# Patient Record
Sex: MALE | Race: OTHER | NOT HISPANIC OR LATINO
[De-identification: names, ages, dates, MRNs, and addresses within clinical notes are randomized per-mention and may not be internally consistent; named-entity substitution may affect disease eponyms.]

---

## 2023-05-15 PROBLEM — Z00.00 ENCOUNTER FOR PREVENTIVE HEALTH EXAMINATION: Status: ACTIVE | Noted: 2023-05-15

## 2023-05-16 ENCOUNTER — APPOINTMENT (OUTPATIENT)
Dept: ULTRASOUND IMAGING | Facility: CLINIC | Age: 28
End: 2023-05-16
Payer: COMMERCIAL

## 2023-05-16 PROCEDURE — 76536 US EXAM OF HEAD AND NECK: CPT

## 2023-10-19 ENCOUNTER — APPOINTMENT (OUTPATIENT)
Dept: OTOLARYNGOLOGY | Facility: CLINIC | Age: 28
End: 2023-10-19
Payer: COMMERCIAL

## 2023-10-19 ENCOUNTER — NON-APPOINTMENT (OUTPATIENT)
Age: 28
End: 2023-10-19

## 2023-10-19 VITALS
BODY MASS INDEX: 27.83 KG/M2 | HEART RATE: 69 BPM | TEMPERATURE: 97.6 F | SYSTOLIC BLOOD PRESSURE: 127 MMHG | HEIGHT: 73 IN | WEIGHT: 210 LBS | DIASTOLIC BLOOD PRESSURE: 85 MMHG

## 2023-10-19 DIAGNOSIS — Z78.9 OTHER SPECIFIED HEALTH STATUS: ICD-10-CM

## 2023-10-19 PROCEDURE — 31575 DIAGNOSTIC LARYNGOSCOPY: CPT

## 2023-10-19 PROCEDURE — 99202 OFFICE O/P NEW SF 15 MIN: CPT | Mod: 25

## 2023-10-19 PROCEDURE — 99204 OFFICE O/P NEW MOD 45 MIN: CPT | Mod: 25

## 2023-11-13 ENCOUNTER — APPOINTMENT (OUTPATIENT)
Dept: ULTRASOUND IMAGING | Facility: HOSPITAL | Age: 28
End: 2023-11-13

## 2023-11-13 ENCOUNTER — OUTPATIENT (OUTPATIENT)
Dept: OUTPATIENT SERVICES | Facility: HOSPITAL | Age: 28
LOS: 1 days | End: 2023-11-13
Payer: COMMERCIAL

## 2023-11-13 PROCEDURE — 76536 US EXAM OF HEAD AND NECK: CPT

## 2023-11-13 PROCEDURE — 76536 US EXAM OF HEAD AND NECK: CPT | Mod: 26

## 2023-11-14 ENCOUNTER — TRANSCRIPTION ENCOUNTER (OUTPATIENT)
Age: 28
End: 2023-11-14

## 2023-11-16 ENCOUNTER — APPOINTMENT (OUTPATIENT)
Dept: OTOLARYNGOLOGY | Facility: CLINIC | Age: 28
End: 2023-11-16
Payer: COMMERCIAL

## 2023-11-16 VITALS
HEIGHT: 73 IN | TEMPERATURE: 97.3 F | SYSTOLIC BLOOD PRESSURE: 128 MMHG | DIASTOLIC BLOOD PRESSURE: 75 MMHG | WEIGHT: 220 LBS | BODY MASS INDEX: 29.16 KG/M2 | HEART RATE: 63 BPM

## 2023-11-16 DIAGNOSIS — R59.0 LOCALIZED ENLARGED LYMPH NODES: ICD-10-CM

## 2023-11-16 PROCEDURE — 99214 OFFICE O/P EST MOD 30 MIN: CPT

## 2024-06-18 ENCOUNTER — NON-APPOINTMENT (OUTPATIENT)
Age: 29
End: 2024-06-18

## 2024-07-26 ENCOUNTER — APPOINTMENT (OUTPATIENT)
Dept: ULTRASOUND IMAGING | Facility: HOSPITAL | Age: 29
End: 2024-07-26

## 2024-08-06 ENCOUNTER — APPOINTMENT (OUTPATIENT)
Dept: OTOLARYNGOLOGY | Facility: CLINIC | Age: 29
End: 2024-08-06

## 2024-08-06 PROCEDURE — 31575 DIAGNOSTIC LARYNGOSCOPY: CPT

## 2024-08-06 PROCEDURE — 69210 REMOVE IMPACTED EAR WAX UNI: CPT

## 2024-08-06 PROCEDURE — 99214 OFFICE O/P EST MOD 30 MIN: CPT | Mod: 25

## 2024-08-06 NOTE — PROCEDURE
[de-identified] : - Pre-operative Diagnosis: Persistent throat discomfort Post-operative Diagnosis: Normal exam Anesthesia: Topical - 1 % Lidocaine/Phenylephrine Procedure:  Flexible Laryngoscopy   Procedure Details:   The patient was placed in the sitting position.  After decongestant and anesthesia were applied the laryngoscope was passed.  The nasal cavities, nasopharynx, oropharynx, hypopharynx, and larynx were all examined.  Vocal folds were examined during respiration and phonation.  The following findings were noted:  Findings:   Nose: Septum is midline, turbinates are normal, nasal airways patent, mucosa normal Nasopharynx: Adenoids normal, no masses, eustachian tube normal Oropharynx: Pharyngeal walls symmetric and without lesion. Tonsils/fossae symmetric Hypopharynx: Hypopharynx and pyriform sinuses without lesion. No masses or asymmetry.  No pooling of secretions. Larynx:  Epiglottis and aryepiglottic folds were sharp and crisp bilaterally.  Bilateral false and true vocal folds normal appearance. Bilateral vocal folds fully mobile and symmetric.  Airway was widely patent.  Condition: Stable.  Patient tolerated procedure well.  Complications: None

## 2024-08-06 NOTE — HISTORY OF PRESENT ILLNESS
[de-identified] : - 10/19/2023 28-year-old gentleman who presents with concern for cervical lymphadenopathy.  Patient states that this been a longstanding issue since May 2023.  At that time he notes that he will have fullness in his neck in the right submandibular region.  This can come and go.  Sometimes the last day sometimes it would last weeks.  He also has referred pain to the right posterior neck as well.  No fevers, night sweats or weight loss.  No smoking history.  No issues chewing, eating, swallowing.  No breathing issues and no voice changes.  Nothing like this is happened previously.  Seen by an outside ENT ultimately had an ultrasound which did show some right-sided submandibular lymphadenopathy next to the submandibular gland.  Ultimately was treated with Augmentin as well as subsequent cefdinir with no improvement.  Had a CT neck which was normal and showed no evidence of any lymphadenopathy.  Lab work also completed and negative.  Briefly also treated for reflux given throat tightness type symptoms as well.  Tried Prilosec to minimal avail.  It is now 3 months since the last visit and patient is requesting second opinion.  Diet: + Caffeine, citrus, tomato, garlic, onion, blueberries - 11/16/2023 No change in symptoms.  No new ear, nose, throat symptoms.  Patient did complete ultrasound neck and presents to review those results. - [FreeTextEntry1] : 8/6/24: Here to review neck US from 7/26/24 to compare to previous from 11/13/24.  Patient otherwise stable and doing well.  No issues chewing, eating, swallowing.  No fevers, night sweats, weight loss.  At times he still notes that there could be some swelling of his right submandibular gland.  He has been following the sialadenitis protocol.  No new ENT issues otherwise.

## 2024-08-06 NOTE — PROCEDURE
[de-identified] : - Pre-operative Diagnosis: Persistent throat discomfort Post-operative Diagnosis: Normal exam Anesthesia: Topical - 1 % Lidocaine/Phenylephrine Procedure:  Flexible Laryngoscopy   Procedure Details:   The patient was placed in the sitting position.  After decongestant and anesthesia were applied the laryngoscope was passed.  The nasal cavities, nasopharynx, oropharynx, hypopharynx, and larynx were all examined.  Vocal folds were examined during respiration and phonation.  The following findings were noted:  Findings:   Nose: Septum is midline, turbinates are normal, nasal airways patent, mucosa normal Nasopharynx: Adenoids normal, no masses, eustachian tube normal Oropharynx: Pharyngeal walls symmetric and without lesion. Tonsils/fossae symmetric Hypopharynx: Hypopharynx and pyriform sinuses without lesion. No masses or asymmetry.  No pooling of secretions. Larynx:  Epiglottis and aryepiglottic folds were sharp and crisp bilaterally.  Bilateral false and true vocal folds normal appearance. Bilateral vocal folds fully mobile and symmetric.  Airway was widely patent.  Condition: Stable.  Patient tolerated procedure well.  Complications: None

## 2024-08-06 NOTE — ASSESSMENT
[FreeTextEntry1] : - 10/19/2023 28-year-old gentleman who presents with concern for right-sided submandibular lymphadenopathy.  Exam today is normal.  Patient relatively asymptomatic today.  At this time, I believe that this could be lymphadenopathy versus sialadenitis.  Lymphoma or malignancy much lower on differential.  I did give the patient the sialadenitis protocol.  I also asked that he not "touch or play with" the area or cause any inflammation.  At this time, I believe he would be due for surveillance with an U/S neck as suggested.  11/16/2023: Patient is overall stable.  Ultrasound completed with what appears to be reactive lymphadenopathy.  At this time I am recommending repeat ultrasound in 6 months and follow-up at that time.  Patient is to follow-up sooner if there is any changing symptomatology.  8/6/2024: Patient overall stable from cervical lymph node standpoint.  No B type symptoms.  Repeat ultrasound completed and reviewed without any significant changes.  The pink said patient does note some worsening sialadenitis and swelling of the right submandibular gland.  Has been following the protocol for conservative treatment strategies.  At this time, I am recommending consultation with Dr. Dena Martinez for potential sialendoscopy with steroids and further evaluation.  At this point patient can follow-up with me in 1 year for repeat ultrasound of the neck and if normal would likely conclude our surveillance.   - U/S neck in 12 months for comparison - sialadenitis protocol -Consultation with Dr. Dena Martinez for sialadenitis - fu 12mo for repeat eval, sooner if there is any new or changing symptoms

## 2024-08-06 NOTE — DATA REVIEWED
[de-identified] : - US HEAD NECK SOFT TISSUE - ORDERED BY: KALEIGH ANN PROCEDURE DATE: 05/16/2023 INTERPRETATION: CLINICAL INFORMATION: Approximately 8 weeks ago the patient developed a sore throat along with tenderness in the submandibular area particularly on the right. He was treated with antibiotics. Now he has persistent pain particularly in the right submandibular area and also, to lesser degree, in the posterior cervical region. COMPARISON: None available. TECHNIQUE: High-resolution sonography of the submandibular region was performed along with the right posterior cervical area. FINDINGS: The right submandibular gland appears normal. Adjacent to the gland, there is a 2 x 0.8 x 1.5 cm lymph node with a thickened cortex. A small hilum is preserved. No other lymphadenopathy is seen. In the right posterior cervical region, corresponding to the area of tenderness to the patient there is a 0.6 x 0.3 cm benign-appearing lymph node.  IMPRESSION: Abnormal-appearing lymph node in the area of concern in the right submandibular region. This is likely reactive. A small normal-appearing lymph node is seen in the region of tenderness in the posterior cervical area. Clinical management is recommended. Follow-up sonography is suggested a few weeks after clinical management to ensure resolution. The patient has been asked to follow-up with you - PROCEDURE DATE:  11/13/2023 INTERPRETATION:  Ultrasound of the head and neck soft tissues INDICATION: Cervical lymphadenopathy TECHNIQUE: Ultrasound of the neck with attention to the cervical lymph nodes. Prior studies: 5/16/2023 FINDINGS: 2 adjacent lymph nodes are identified in the right submandibular region the larger measuring 2.2 x 0.9 cm and the smaller measuring 1.7 x 0.8 cm. They both demonstrate a normal fatty hilum and a cortical thickness of up to 0.5 cm. Exact comparison with prior study is difficult due to difference in technique but there appears to be slight decrease in size. In the left submandibular region, 2 lymph nodes are identified, one measuring 3 x 0.8 x 1.5 cm and the other measuring 2.5 x 0.9 x 1.9 cm. The larger lymph node contains a normal fatty hilum and a cortical thickness of 0.5 cm. The other lymph node does not demonstrate a clear fatty hilum which could be due to deep positioning. Again, comparison with prior study is difficult due to difference in technique.  IMPRESSION: Mildly enlarged probably reactive bilateral cervical lymph nodes as above. - EXAM:  US HEAD NECK SOFT TISSUE   ORDERED BY: SAWYER VERDUZCO PROCEDURE DATE:  07/26/2024 CLINICAL INFORMATION: Cervical lymphadenopathy COMPARISON: 11/13/2023 and 5/16/2023 TECHNIQUE:  Ultrasound of the neck was performed. FINDINGS: 2 adjacent lymph nodes are again identified in the right submandibular region the larger measuring 2.2 x 0.6 cm and the smaller measuring 2 x 0.7 cm. They both demonstrate a normal fatty hilum.  In the left side of the neck at level 2, there is a 3.5 x 0.9 cm lymph node. At level 3 there is a 2.5 x 0.5 cm lymph node. No new lymphadenopathy is identified.  IMPRESSION: No significant change in mildly enlarged bilateral cervical lymph nodes. Consider follow-up in one year. -

## 2024-08-06 NOTE — HISTORY OF PRESENT ILLNESS
[de-identified] : - 10/19/2023 28-year-old gentleman who presents with concern for cervical lymphadenopathy.  Patient states that this been a longstanding issue since May 2023.  At that time he notes that he will have fullness in his neck in the right submandibular region.  This can come and go.  Sometimes the last day sometimes it would last weeks.  He also has referred pain to the right posterior neck as well.  No fevers, night sweats or weight loss.  No smoking history.  No issues chewing, eating, swallowing.  No breathing issues and no voice changes.  Nothing like this is happened previously.  Seen by an outside ENT ultimately had an ultrasound which did show some right-sided submandibular lymphadenopathy next to the submandibular gland.  Ultimately was treated with Augmentin as well as subsequent cefdinir with no improvement.  Had a CT neck which was normal and showed no evidence of any lymphadenopathy.  Lab work also completed and negative.  Briefly also treated for reflux given throat tightness type symptoms as well.  Tried Prilosec to minimal avail.  It is now 3 months since the last visit and patient is requesting second opinion.  Diet: + Caffeine, citrus, tomato, garlic, onion, blueberries - 11/16/2023 No change in symptoms.  No new ear, nose, throat symptoms.  Patient did complete ultrasound neck and presents to review those results. - [FreeTextEntry1] : 8/6/24: Here to review neck US from 7/26/24 to compare to previous from 11/13/24.  Patient otherwise stable and doing well.  No issues chewing, eating, swallowing.  No fevers, night sweats, weight loss.  At times he still notes that there could be some swelling of his right submandibular gland.  He has been following the sialadenitis protocol.  No new ENT issues otherwise.

## 2024-08-06 NOTE — DATA REVIEWED
[de-identified] : - US HEAD NECK SOFT TISSUE - ORDERED BY: KALEIGH ANN PROCEDURE DATE: 05/16/2023 INTERPRETATION: CLINICAL INFORMATION: Approximately 8 weeks ago the patient developed a sore throat along with tenderness in the submandibular area particularly on the right. He was treated with antibiotics. Now he has persistent pain particularly in the right submandibular area and also, to lesser degree, in the posterior cervical region. COMPARISON: None available. TECHNIQUE: High-resolution sonography of the submandibular region was performed along with the right posterior cervical area. FINDINGS: The right submandibular gland appears normal. Adjacent to the gland, there is a 2 x 0.8 x 1.5 cm lymph node with a thickened cortex. A small hilum is preserved. No other lymphadenopathy is seen. In the right posterior cervical region, corresponding to the area of tenderness to the patient there is a 0.6 x 0.3 cm benign-appearing lymph node.  IMPRESSION: Abnormal-appearing lymph node in the area of concern in the right submandibular region. This is likely reactive. A small normal-appearing lymph node is seen in the region of tenderness in the posterior cervical area. Clinical management is recommended. Follow-up sonography is suggested a few weeks after clinical management to ensure resolution. The patient has been asked to follow-up with you - PROCEDURE DATE:  11/13/2023 INTERPRETATION:  Ultrasound of the head and neck soft tissues INDICATION: Cervical lymphadenopathy TECHNIQUE: Ultrasound of the neck with attention to the cervical lymph nodes. Prior studies: 5/16/2023 FINDINGS: 2 adjacent lymph nodes are identified in the right submandibular region the larger measuring 2.2 x 0.9 cm and the smaller measuring 1.7 x 0.8 cm. They both demonstrate a normal fatty hilum and a cortical thickness of up to 0.5 cm. Exact comparison with prior study is difficult due to difference in technique but there appears to be slight decrease in size. In the left submandibular region, 2 lymph nodes are identified, one measuring 3 x 0.8 x 1.5 cm and the other measuring 2.5 x 0.9 x 1.9 cm. The larger lymph node contains a normal fatty hilum and a cortical thickness of 0.5 cm. The other lymph node does not demonstrate a clear fatty hilum which could be due to deep positioning. Again, comparison with prior study is difficult due to difference in technique.  IMPRESSION: Mildly enlarged probably reactive bilateral cervical lymph nodes as above. - EXAM:  US HEAD NECK SOFT TISSUE   ORDERED BY: SAWYER VERDUZCO PROCEDURE DATE:  07/26/2024 CLINICAL INFORMATION: Cervical lymphadenopathy COMPARISON: 11/13/2023 and 5/16/2023 TECHNIQUE:  Ultrasound of the neck was performed. FINDINGS: 2 adjacent lymph nodes are again identified in the right submandibular region the larger measuring 2.2 x 0.6 cm and the smaller measuring 2 x 0.7 cm. They both demonstrate a normal fatty hilum.  In the left side of the neck at level 2, there is a 3.5 x 0.9 cm lymph node. At level 3 there is a 2.5 x 0.5 cm lymph node. No new lymphadenopathy is identified.  IMPRESSION: No significant change in mildly enlarged bilateral cervical lymph nodes. Consider follow-up in one year. -